# Patient Record
Sex: FEMALE | Race: BLACK OR AFRICAN AMERICAN | NOT HISPANIC OR LATINO | Employment: UNEMPLOYED | URBAN - METROPOLITAN AREA
[De-identification: names, ages, dates, MRNs, and addresses within clinical notes are randomized per-mention and may not be internally consistent; named-entity substitution may affect disease eponyms.]

---

## 2018-05-13 LAB — HEP C VIRUS AB: NEGATIVE

## 2018-05-16 ENCOUNTER — HOSPITAL ENCOUNTER (EMERGENCY)
Facility: HOSPITAL | Age: 38
Discharge: HOME | End: 2018-05-17
Attending: EMERGENCY MEDICINE
Payer: MEDICAID

## 2018-05-16 DIAGNOSIS — T24.101A: ICD-10-CM

## 2018-05-16 DIAGNOSIS — T24.201A SECOND DEGREE BURN OF LEG, RIGHT, INITIAL ENCOUNTER: Primary | ICD-10-CM

## 2018-05-16 PROCEDURE — 25000003 PHARM REV CODE 250: Performed by: EMERGENCY MEDICINE

## 2018-05-16 PROCEDURE — 90471 IMMUNIZATION ADMIN: CPT | Performed by: EMERGENCY MEDICINE

## 2018-05-16 PROCEDURE — 99283 EMERGENCY DEPT VISIT LOW MDM: CPT

## 2018-05-16 PROCEDURE — 90715 TDAP VACCINE 7 YRS/> IM: CPT | Performed by: EMERGENCY MEDICINE

## 2018-05-16 PROCEDURE — 63600175 PHARM REV CODE 636 W HCPCS: Performed by: EMERGENCY MEDICINE

## 2018-05-16 RX ORDER — BACITRACIN 500 [USP'U]/G
OINTMENT TOPICAL
Status: COMPLETED | OUTPATIENT
Start: 2018-05-16 | End: 2018-05-16

## 2018-05-16 RX ORDER — LISINOPRIL 2.5 MG/1
2.5 TABLET ORAL DAILY
COMMUNITY

## 2018-05-16 RX ORDER — LOVASTATIN 20 MG/1
20 TABLET ORAL NIGHTLY
COMMUNITY

## 2018-05-16 RX ORDER — OMEPRAZOLE 40 MG/1
40 CAPSULE, DELAYED RELEASE ORAL DAILY
COMMUNITY

## 2018-05-16 RX ADMIN — BACITRACIN: 500 OINTMENT TOPICAL at 10:05

## 2018-05-16 RX ADMIN — CLOSTRIDIUM TETANI TOXOID ANTIGEN (FORMALDEHYDE INACTIVATED), CORYNEBACTERIUM DIPHTHERIAE TOXOID ANTIGEN (FORMALDEHYDE INACTIVATED), BORDETELLA PERTUSSIS TOXOID ANTIGEN (GLUTARALDEHYDE INACTIVATED), BORDETELLA PERTUSSIS FILAMENTOUS HEMAGGLUTININ ANTIGEN (FORMALDEHYDE INACTIVATED), BORDETELLA PERTUSSIS PERTACTIN ANTIGEN, AND BORDETELLA PERTUSSIS FIMBRIAE 2/3 ANTIGEN 0.5 ML: 5; 2; 2.5; 5; 3; 5 INJECTION, SUSPENSION INTRAMUSCULAR at 10:05

## 2018-05-17 VITALS
RESPIRATION RATE: 18 BRPM | SYSTOLIC BLOOD PRESSURE: 135 MMHG | WEIGHT: 50 LBS | DIASTOLIC BLOOD PRESSURE: 82 MMHG | HEART RATE: 75 BPM | TEMPERATURE: 98 F | OXYGEN SATURATION: 99 %

## 2018-05-17 RX ORDER — IBUPROFEN 800 MG/1
800 TABLET ORAL EVERY 6 HOURS PRN
Qty: 20 TABLET | Refills: 0 | Status: SHIPPED | OUTPATIENT
Start: 2018-05-17

## 2018-05-17 RX ORDER — SULFAMETHOXAZOLE AND TRIMETHOPRIM 800; 160 MG/1; MG/1
1 TABLET ORAL 2 TIMES DAILY
Qty: 14 TABLET | Refills: 0 | Status: SHIPPED | OUTPATIENT
Start: 2018-05-17 | End: 2018-05-24

## 2018-05-17 RX ORDER — BACITRACIN ZINC 500 UNIT/G
OINTMENT (GRAM) TOPICAL DAILY
Qty: 30 G | Refills: 0 | COMMUNITY
Start: 2018-05-17
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
## 2018-05-18 ENCOUNTER — PES CALL (OUTPATIENT)
Dept: ADMINISTRATIVE | Facility: CLINIC | Age: 38
End: 2018-05-18

## 2019-04-02 DIAGNOSIS — M54.2 NECK PAIN: Primary | ICD-10-CM

## 2019-04-25 ENCOUNTER — CLINICAL SUPPORT (OUTPATIENT)
Dept: REHABILITATION | Facility: HOSPITAL | Age: 39
End: 2019-04-25
Attending: NURSE PRACTITIONER
Payer: MEDICAID

## 2019-04-25 DIAGNOSIS — M62.81 MUSCLE WEAKNESS: ICD-10-CM

## 2019-04-25 DIAGNOSIS — M54.2 NECK PAIN: ICD-10-CM

## 2019-04-25 DIAGNOSIS — M62.89 MUSCLE TIGHTNESS: ICD-10-CM

## 2019-04-25 PROCEDURE — 97161 PT EVAL LOW COMPLEX 20 MIN: CPT | Mod: PN

## 2019-04-25 NOTE — PROGRESS NOTES
"                                                    Physical Therapy Initial Evaluation     Name: Gisela Nguyễn  Clinic Number: [id number]    Therapy Diagnosis:   Encounter Diagnoses   Name Primary?    Neck pain     Muscle weakness     Muscle tightness      Physician: Ann Genao, NP-C    Physician Orders: PT Eval and Treat   Medical Diagnosis from Referral: Neck pain  Evaluation Date: 4/25/2019  Authorization Period Expiration: 04/01/2020  Plan of Care Expiration: 6/25/19  Visit # / Visits authorized: 1/ 1    Time In: 1100  Time Out: 1200  Total Billable Time: 60 minutes    Precautions: Standard    Subjective     Medical History:   No past medical history on file.    Surgical History:   Gisela Nguyễn  has no past surgical history on file.    Medications:   Gisela BLANK has a current medication list which includes the following prescription(s): bacitracin, ibuprofen, lisinopril, lovastatin, and omeprazole.    Allergies:   Review of patient's allergies indicates:  No Known Allergies     Date of onset: 3-4 months ago  History of current condition - Gisela reports: Midline neck pain that can have swelling over the spinous process. Has tenderness to palpation.  Pain began 3-4 months ago with X-ray performed with "mild case of arthritis". She is  at [employer] which job duties includes heavy lifting.  Denies prior pain in neck before 3 months ago. Had R dislocated shoulder in February 2019, required going to hospital and had R shoulder relocated. No previous surgeries or injections to neck or shoulder. Denies numbness or tingling. Pt states she was diagnosed with scoliosis in grade school, but never treated. R-handed.   Still working full duty,    Prior Therapy: None  Social History: lives with daughter  Occupation: Asst Manager at [employer]  Prior Level of Function: Indep  DME owned/used: None  Current Level of Function: Indep    Pain:  Current 7/10, worst 9/10, best 3/10   Location: midline lower " cervical spine  Description: Aching  Aggravating Factors: lifting objects overhead, picking objects from floor, turning head to either side  Easing Factors: heat    Pts goals: Reduce pain, be able to lift over-head, decrease tenderness to touch    Objective     Posture Alignment: forward head, slouched posture    CERVICAL SPINE AROM:   Flexion: 50   Extension: 40   Left Sidebend: 45   Right Sidebend: 45   Left Rotation: 80   Right Rotation: 80     UE ROM:  R UE: WNL; p! R UT/LS  LUE: WNL - ipsilateral cervical side-bending    SEGMENTAL MOBILITY: WFL C2-6, C7 empty end-feel due to muscle guarding/pain    UPPER EXTREMITY STRENGTH:   Left Right   Shoulder Flexion 4+/5 4+/5   Shoulder Abduction 4+/5 4+/5     Shoulder Internal Rotation 4+/5 4+/5   Shoulder External Rotation 4/5 4/5   Elbow Flexion 4+/5 4+/5   Elbow Extension 4/5 4/5     Dermatomes: Sensation: Light Touch: Intact  Myotomes: intact  Palpation: acute tenderness to C7 spinous process, R UT/LS    Special Tests:   Left Right   Compression Negative Negative   Distraction Negative Negative   Spurling Negative Negative     Pt/family was provided educational information, including: role of PT, goals for PT, scheduling - pt verbalized understanding. Discussed insurance limitations with pt.     Functional Limitations Reports - G Codes  Category: Mobility  Tool: FOTO Neck Survey  Score: 37% Limitation   Modifier  Impairment Limitation Restriction    CH  0 % impaired, limited or restricted    CI  @ least 1% but less than 20% impaired, limited or restricted    CJ  @ least 20%<40% impaired, limited or restricted    CK  @ least 40%<60% impaired, limited or restricted    CL  @ least 60% <80% impaired, limited or restricted    CM  @ least 80%<100% impaired limited or restricted    CN  100% impaired, limited or restricted     Current/  CJ = 20-40% limitation  Goal/ : CI =  1-20% limitation    TREATMENT     Treatment Time In: 1130  Treatment Time Out: 1145  Total  "Treatment time separate from Evaluation: 15 minutes    Gisela received therapeutic exercises to develop strength, endurance, ROM, flexibility, posture and core stabilization for 15 minutes including:    Supine Chin Tuck 15x  Upper Trap Str 2x30" ea  Lev Scap Str 2x30" ea    Home Exercises and Patient Education Provided    Education provided:   Written Home Exercises Provided: yes.  Exercises were reviewed and Gisela was able to demonstrate them prior to the end of the session.  Gisela demonstrated good  understanding of the education provided.     See EMR under Patient Instructions for exercises provided 4/25/2019.    Assessment     Gisela BLANK is a 39 y.o. female referred to outpatient Physical Therapy with a medical diagnosis of Neck Pain. with signs and symptoms including: increased neck pain, decreased lumbar ROM, decreased LE Strength, soft tissue dysfunction, postural imbalance,impaired joint mobility, and decreased tolerance to functional activities. Pain isolated to midline cervical spine, with acute tenderness to C7 spinous process; mild tenderness and soft tissue restriction at R UT/LS musculature. Fair cervical ROM and UE strength maintained. No paresthesia noted this session with light touch intact during sensory testing. Soft tissue restriction noted at R UT/LS with palpable myofascial trigger point identified. Impaired posture with forward head and rounded thoracic kyphosis contributing to symptom provocation.   Pt with good motivation to perform physical activity and responds well to cueing.    Pt prognosis is Good.   Pt will benefit from skilled outpatient Physical Therapy to address the deficits stated above and in the chart below, provide pt/family education, and to maximize pt's level of independence.     Plan of care discussed with patient: Yes  Pt's spiritual, cultural and educational needs considered and patient is agreeable to the plan of care and goals as stated below:     Anticipated Barriers for " therapy: none    Medical Necessity is demonstrated by the following  History  Co-morbidities and personal factors that may impact the plan of care Co-morbidities:   HTN, GERD    Personal Factors:   no deficits     low   Examination  Body Structures and Functions, activity limitations and participation restrictions that may impact the plan of care Body Regions:   neck  upper extremities  trunk    Body Systems:    gross symmetry  ROM  strength  gross coordinated movement  transitions  motor control  motor learning    Participation Restrictions:   Heavy household activities, lifting activities at work, recreational activity, driving (checking blind spot)    Activity limitations:   Learning and applying knowledge  no deficits    General Tasks and Commands  no deficits    Communication  no deficits    Mobility  lifting and carrying objects    Self care  no deficits    Domestic Life  shopping  cooking  doing house work (cleaning house, washing dishes, laundry)  assisting others    Interactions/Relationships  No deficits    Life Areas  No deficits    Community and Social Life  No deficits         low   Clinical Presentation stable and uncomplicated low   Decision Making/ Complexity Score: low     Pt's spiritual, cultural and educational needs considered and pt agreeable to plan of care and goals as stated below:     Short Term GOALS: 4 weeks. Pt agrees with goals set.  1. Patient demonstrates independence with HEP.   2. Patient demonstrates independence with Postural Awareness.   3. Patient demonstrates independence with body mechanics.   4. Patient will report pain of 5/10 at worst, on 0-10 pain scale, with all activity  5. Patient demonstrates increased cervical ROM by 5 degrees in all planes to improve tolerance to functional activities pain free.   6. Patient demonstrates increased strength BUE's by 1/3 muscle grade or greater to improve tolerance to functional activities pain free.     Long Term GOALS: 8 weeks. Pt  agrees with goals set.  1. Patient demonstrates increased cervical ROM to WNL to improve tolerance to functional activities pain free.   2. Patient demonstrates increased strength BUE's to 4+/5 or greater to improve tolerance to functional activities pain free.   3. Patient demonstrates improved overall function per FOTO Neck Survey to 30% Limitation or less.   4. Patient will report pain of 0/10 at worst, on 0-10 pain scale, with all activity  5. Patient demonstrates ability to lift 10 pound object from floor to waist, proper lifting mechanics, no pain provoked  6. Patient demonstrates ability to lift 10 pound object from waist to over-head shelf, proper lifting mechanics, no pain provoked    PLAN     Plan of care Certification: 4/25/2019 to 6/25/19.    Outpatient Physical Therapy 2 times weekly for 8 weeks to include the following interventions: Cervical/Lumbar Traction, Electrical Stimulation IFC/NMES, Gait Training, Iontophoresis (with Dexamethasone), Manual Therapy, Moist Heat/ Ice, Neuromuscular Re-ed, Patient Education, Self Care, Therapeutic Activites, Therapeutic Exercise, Ultrasound and Dry Needling.  Pt may be seen by PTA as part of the rehabilitation team.     Orlando Soto, PT  4/25/2019    I have seen the patient, reviewed the therapist's plan of care, and I agree with the plan of care.      I certify the need for these services furnished under this plan of treatment and while under my care.     ___________________ ________ Physician/Referring Practitioner            ___________________________ Date of Signature

## 2019-05-02 NOTE — PROGRESS NOTES
"  Physical Therapy Daily Treatment Note     Name: Gisela Nguyễn  Clinic Number: [id number]    Therapy Diagnosis:   Encounter Diagnoses   Name Primary?    Neck pain Yes    Muscle weakness     Muscle tightness      Physician: Ann Genao NPJuanC    Visit Date: 5/3/2019    Physician Orders: PT Eval and Treat  Medical Diagnosis: Neck pain  Evaluation Date: 4/25/19  Authorization Period Expiration: 4/1/20  Plan of Care Certification Period: 6/25/19  Visit #/Visits authorized: 2/ 16     Time In: 1040  Time Out: 1140  Total Billable Time: 60 minutes    Precautions: Standard    Subjective     Pt reports: Feels like things have been about the same  She was compliant with home exercise program.  Response to previous treatment: Reduced pain  Functional change: No change    Pain: 6/10  Location: bilateral neck      Objective     Gisela received therapeutic exercises to develop strength and posture for 48 minutes including:    +UBE x 5 mins     Supine Chin Tuck x 15   Upper Trap Str 3 x 30" ea  Lev Scap Str 3 x 30" ea  +Open book x 20   Scap retraction x 20   +No money RTB 2 x 15   +Press up 2# dowel x 20     +Supine should abd RTB 2 x 15  +Standing shoulder extension RTB 2 x 15         Gisela received the following manual therapy techniques: Myofacial release were applied 12 minutes, including:    Suboccipital release with manual upper trap stretch    Gisela received hot pack for 10 minutes to Neck.    Gisela received cold pack for 0 minutes to .      Home Exercises Provided and Patient Education Provided     Education provided:   - Continue with HEP, posture when sitting    Written Home Exercises Provided: Patient instructed to cont prior HEP.  Exercises were reviewed and Gisela was able to demonstrate them prior to the end of the session.  Gisela demonstrated good  understanding of the education provided.     See EMR under Patient Instructions for exercises provided prior visit.    Assessment     Patient tolerated today's " treatment session well. Staff progressed postural exercises and incorporated STM end of session with positive results. Continues to benefit from skilled therapy sessions for progression of exercise routine.     Gisela is progressing well towards her goals.   Pt prognosis is Excellent.     Pt will continue to benefit from skilled outpatient physical therapy to address the deficits listed in the problem list box on initial evaluation, provide pt/family education and to maximize pt's level of independence in the home and community environment.     Pt's spiritual, cultural and educational needs considered and pt agreeable to plan of care and goals.     Anticipated barriers to physical therapy: Pain    Goals:     Short Term GOALS: 4 weeks. Pt agrees with goals set.  1. Patient demonstrates independence with HEP.   2. Patient demonstrates independence with Postural Awareness.   3. Patient demonstrates independence with body mechanics.   4. Patient will report pain of 5/10 at worst, on 0-10 pain scale, with all activity  5. Patient demonstrates increased cervical ROM by 5 degrees in all planes to improve tolerance to functional activities pain free.   6. Patient demonstrates increased strength BUE's by 1/3 muscle grade or greater to improve tolerance to functional activities pain free.      Long Term GOALS: 8 weeks. Pt agrees with goals set.  1. Patient demonstrates increased cervical ROM to WNL to improve tolerance to functional activities pain free.   2. Patient demonstrates increased strength BUE's to 4+/5 or greater to improve tolerance to functional activities pain free.   3. Patient demonstrates improved overall function per FOTO Neck Survey to 30% Limitation or less.   4. Patient will report pain of 0/10 at worst, on 0-10 pain scale, with all activity  5. Patient demonstrates ability to lift 10 pound object from floor to waist, proper lifting mechanics, no pain provoked  6. Patient demonstrates ability to lift 10  pound object from waist to over-head shelf, proper lifting mechanics, no pain provoked        Plan     Plan of care Certification: 4/25/2019 to 6/25/19.     Outpatient Physical Therapy 2 times weekly for 8 weeks to include the following interventions: Cervical/Lumbar Traction, Electrical Stimulation IFC/NMES, Gait Training, Iontophoresis (with Dexamethasone), Manual Therapy, Moist Heat/ Ice, Neuromuscular Re-ed, Patient Education, Self Care, Therapeutic Activites, Therapeutic Exercise, Ultrasound and Dry Needling.  Pt may be seen by PTA as part of the rehabilitation team.       Hernán Benjamin, PTA   05/02/2019

## 2019-05-03 ENCOUNTER — CLINICAL SUPPORT (OUTPATIENT)
Dept: REHABILITATION | Facility: HOSPITAL | Age: 39
End: 2019-05-03
Attending: NURSE PRACTITIONER
Payer: MEDICAID

## 2019-05-03 DIAGNOSIS — M62.89 MUSCLE TIGHTNESS: ICD-10-CM

## 2019-05-03 DIAGNOSIS — M54.2 NECK PAIN: Primary | ICD-10-CM

## 2019-05-03 DIAGNOSIS — M62.81 MUSCLE WEAKNESS: ICD-10-CM

## 2019-05-03 PROCEDURE — 97140 MANUAL THERAPY 1/> REGIONS: CPT | Mod: PN

## 2019-05-03 PROCEDURE — 97110 THERAPEUTIC EXERCISES: CPT | Mod: PN

## 2019-05-09 ENCOUNTER — CLINICAL SUPPORT (OUTPATIENT)
Dept: REHABILITATION | Facility: HOSPITAL | Age: 39
End: 2019-05-09
Attending: NURSE PRACTITIONER
Payer: MEDICAID

## 2019-05-09 DIAGNOSIS — M62.89 MUSCLE TIGHTNESS: ICD-10-CM

## 2019-05-09 DIAGNOSIS — M62.81 MUSCLE WEAKNESS: ICD-10-CM

## 2019-05-09 DIAGNOSIS — M54.2 NECK PAIN: Primary | ICD-10-CM

## 2019-05-09 PROCEDURE — 97110 THERAPEUTIC EXERCISES: CPT | Mod: PN

## 2019-05-09 PROCEDURE — 97140 MANUAL THERAPY 1/> REGIONS: CPT | Mod: PN

## 2019-05-09 NOTE — PROGRESS NOTES
"  Physical Therapy Daily Treatment Note     Name: Gisela Nguyễn  Clinic Number: [id number]    Therapy Diagnosis:   Encounter Diagnoses   Name Primary?    Neck pain Yes    Muscle weakness     Muscle tightness      Physician: Ann Genao NPJuanC    Visit Date: 5/9/2019    Physician Orders: PT Eval and Treat  Medical Diagnosis: Neck pain  Evaluation Date: 4/25/19  Authorization Period Expiration: 4/1/20  Plan of Care Certification Period: 6/25/19  Visit #/Visits authorized: 3/ 16   PTA Visit 2/6     Time In: 10:45  Time Out: 1145  Total Billable Time: 55 minutes    Precautions: Standard    Subjective     Pt reports: Felt relief after last session but pain returned when she began working. Continues with HEP outside of sessions. Pain is a little bit higher today.     She was compliant with home exercise program.  Response to previous treatment: Reduced pain  Functional change: No change    Pain: 8/10  Location: bilateral neck      Objective     Gisela received therapeutic exercises to develop strength and posture for 45 minutes including:    UBE x 6 mins     Supine Chin Tuck x 15   Upper Trap Str 3 x 30" ea  Lev Scap Str 3 x 30" ea  Open book x 20   Scap retraction x 20   No money RTB 2 x 15   Press up 3# dowel x 20   +Scap protraction 3# dowel x 20     Supine should abd RTB 2 x 15  Standing shoulder extension RTB 2 x 15   +Standing row RTB 2 x 15        Gisela received the following manual therapy techniques: Myofacial release were applied 10 minutes, including:    Suboccipital release with manual upper trap stretch    Gisela received hot pack for 10 minutes to Neck.    Gisela received cold pack for 0 minutes to .      Home Exercises Provided and Patient Education Provided     Education provided:   - Continue with HEP, posture when sitting    Written Home Exercises Provided: Patient instructed to cont prior HEP.  Exercises were reviewed and Gisela was able to demonstrate them prior to the end of the session.  Gisela " demonstrated good  understanding of the education provided.     See EMR under Patient Instructions for exercises provided prior visit.    Assessment     Patient tolerated today's treatment session well. Staff progressed exercises today with appropriate level of muscle fatigue achieved. Relief provided by STM end of session and with combination of heat application. Patient continues to benefit from skilled sessions for progression of strengthening activities.     Gisela is progressing well towards her goals.   Pt prognosis is Excellent.     Pt will continue to benefit from skilled outpatient physical therapy to address the deficits listed in the problem list box on initial evaluation, provide pt/family education and to maximize pt's level of independence in the home and community environment.     Pt's spiritual, cultural and educational needs considered and pt agreeable to plan of care and goals.     Anticipated barriers to physical therapy: Pain    Goals:     Short Term GOALS: 4 weeks. Pt agrees with goals set.  1. Patient demonstrates independence with HEP.   2. Patient demonstrates independence with Postural Awareness.   3. Patient demonstrates independence with body mechanics.   4. Patient will report pain of 5/10 at worst, on 0-10 pain scale, with all activity  5. Patient demonstrates increased cervical ROM by 5 degrees in all planes to improve tolerance to functional activities pain free.   6. Patient demonstrates increased strength BUE's by 1/3 muscle grade or greater to improve tolerance to functional activities pain free.      Long Term GOALS: 8 weeks. Pt agrees with goals set.  1. Patient demonstrates increased cervical ROM to WNL to improve tolerance to functional activities pain free.   2. Patient demonstrates increased strength BUE's to 4+/5 or greater to improve tolerance to functional activities pain free.   3. Patient demonstrates improved overall function per FOTO Neck Survey to 30% Limitation or  less.   4. Patient will report pain of 0/10 at worst, on 0-10 pain scale, with all activity  5. Patient demonstrates ability to lift 10 pound object from floor to waist, proper lifting mechanics, no pain provoked  6. Patient demonstrates ability to lift 10 pound object from waist to over-head shelf, proper lifting mechanics, no pain provoked        Plan     Plan of care Certification: 4/25/2019 to 6/25/19.     Outpatient Physical Therapy 2 times weekly for 8 weeks to include the following interventions: Cervical/Lumbar Traction, Electrical Stimulation IFC/NMES, Gait Training, Iontophoresis (with Dexamethasone), Manual Therapy, Moist Heat/ Ice, Neuromuscular Re-ed, Patient Education, Self Care, Therapeutic Activites, Therapeutic Exercise, Ultrasound and Dry Needling.  Pt may be seen by PTA as part of the rehabilitation team.       Hernán Benjamin, PTA   05/09/2019

## 2019-05-15 ENCOUNTER — CLINICAL SUPPORT (OUTPATIENT)
Dept: REHABILITATION | Facility: HOSPITAL | Age: 39
End: 2019-05-15
Attending: NURSE PRACTITIONER
Payer: MEDICAID

## 2019-05-15 DIAGNOSIS — M62.89 MUSCLE TIGHTNESS: ICD-10-CM

## 2019-05-15 DIAGNOSIS — M54.2 NECK PAIN: ICD-10-CM

## 2019-05-15 DIAGNOSIS — M62.81 MUSCLE WEAKNESS: ICD-10-CM

## 2019-05-15 PROCEDURE — 97110 THERAPEUTIC EXERCISES: CPT | Mod: PN

## 2019-05-15 PROCEDURE — 97140 MANUAL THERAPY 1/> REGIONS: CPT | Mod: PN

## 2019-05-15 NOTE — PROGRESS NOTES
"  Physical Therapy Daily Treatment Note     Name: Gisela Nguyễn  Clinic Number: [id number]    Therapy Diagnosis:   Encounter Diagnoses   Name Primary?    Neck pain     Muscle weakness     Muscle tightness      Physician: Ann Genao NPJuanC    Visit Date: 5/15/2019    Physician Orders: PT Eval and Treat  Medical Diagnosis: Neck pain  Evaluation Date: 4/25/19  Authorization Period Expiration: 4/1/20  Plan of Care Certification Period: 6/25/19  Visit #/Visits authorized: 4/ 16   PTA Visit 3/6     Time In: 10:40   Time Out: 1140  Total Billable Time: 30 minutes    Precautions: Standard    Subjective     Pt reports: Pain less today. Feels that overall things are improving.     She was compliant with home exercise program.  Response to previous treatment: Reduced pain  Functional change: No change    Pain: 5/10  Location: bilateral neck      Objective     Gisela received therapeutic exercises to develop strength and posture for 45 minutes including:    UBE x 6 mins     Supine Chin Tuck x 15   +Supine cervical rotation x 20   Upper Trap Str 3 x 30" ea  Lev Scap Str 3 x 30" ea  Open book x 20   Scap retraction x 20   No money RTB 2 x 15   Press up 3# dowel x 20   Scap protraction 3# dowel x 20     Supine should abd RTB 2 x 15  Standing shoulder extension RTB 2 x 15   Standing row RTB 2 x 15  +Push up plus 2 x 10         Gisela received the following manual therapy techniques: Myofacial release were applied 15 minutes, including:    Suboccipital release with manual upper trap stretch    Gisela received hot pack for 10 minutes to Neck.    Gisela received cold pack for 0 minutes to .      Home Exercises Provided and Patient Education Provided     Education provided:   - Continue with HEP, posture when sitting    Written Home Exercises Provided: Patient instructed to cont prior HEP.  Exercises were reviewed and Gisela was able to demonstrate them prior to the end of the session.  Gisela demonstrated good  understanding of the " education provided.     See EMR under Patient Instructions for exercises provided prior visit.    Assessment     Patient tolerated today's treatment session well. Staff monitored patient response to activity and provided cues for technique as needed. Continues to report pain after working. Educated patient on body mechanics and avoiding lifting objects over head. Continues to benefit from skilled therapies for progression of strength activities. Patient instructed in towel/tennis ball sub occipital release for home. Pain end of session rated 3/10.     Gisela is progressing well towards her goals.   Pt prognosis is Excellent.     Pt will continue to benefit from skilled outpatient physical therapy to address the deficits listed in the problem list box on initial evaluation, provide pt/family education and to maximize pt's level of independence in the home and community environment.     Pt's spiritual, cultural and educational needs considered and pt agreeable to plan of care and goals.     Anticipated barriers to physical therapy: Pain    Goals:     Short Term GOALS: 4 weeks. Pt agrees with goals set.  1. Patient demonstrates independence with HEP.   2. Patient demonstrates independence with Postural Awareness.   3. Patient demonstrates independence with body mechanics.   4. Patient will report pain of 5/10 at worst, on 0-10 pain scale, with all activity  5. Patient demonstrates increased cervical ROM by 5 degrees in all planes to improve tolerance to functional activities pain free.   6. Patient demonstrates increased strength BUE's by 1/3 muscle grade or greater to improve tolerance to functional activities pain free.      Long Term GOALS: 8 weeks. Pt agrees with goals set.  1. Patient demonstrates increased cervical ROM to WNL to improve tolerance to functional activities pain free.   2. Patient demonstrates increased strength BUE's to 4+/5 or greater to improve tolerance to functional activities pain free.   3.  Patient demonstrates improved overall function per FOTO Neck Survey to 30% Limitation or less.   4. Patient will report pain of 0/10 at worst, on 0-10 pain scale, with all activity  5. Patient demonstrates ability to lift 10 pound object from floor to waist, proper lifting mechanics, no pain provoked  6. Patient demonstrates ability to lift 10 pound object from waist to over-head shelf, proper lifting mechanics, no pain provoked        Plan     Plan of care Certification: 4/25/2019 to 6/25/19.     Outpatient Physical Therapy 2 times weekly for 8 weeks to include the following interventions: Cervical/Lumbar Traction, Electrical Stimulation IFC/NMES, Gait Training, Iontophoresis (with Dexamethasone), Manual Therapy, Moist Heat/ Ice, Neuromuscular Re-ed, Patient Education, Self Care, Therapeutic Activites, Therapeutic Exercise, Ultrasound and Dry Needling.  Pt may be seen by PTA as part of the rehabilitation team.       Hernán Benjamin, ANA   05/15/2019

## 2019-05-20 ENCOUNTER — CLINICAL SUPPORT (OUTPATIENT)
Dept: REHABILITATION | Facility: HOSPITAL | Age: 39
End: 2019-05-20
Attending: NURSE PRACTITIONER
Payer: MEDICAID

## 2019-05-20 DIAGNOSIS — M62.81 MUSCLE WEAKNESS: ICD-10-CM

## 2019-05-20 DIAGNOSIS — M62.89 MUSCLE TIGHTNESS: ICD-10-CM

## 2019-05-20 DIAGNOSIS — M54.2 NECK PAIN: ICD-10-CM

## 2019-05-20 PROCEDURE — 97140 MANUAL THERAPY 1/> REGIONS: CPT | Mod: PN

## 2019-05-20 PROCEDURE — 97110 THERAPEUTIC EXERCISES: CPT | Mod: PN

## 2019-05-20 NOTE — PROGRESS NOTES
"  Physical Therapy Daily Treatment Note     Name: Gisela Nguyễn  Clinic Number: [id number]    Therapy Diagnosis:   Encounter Diagnoses   Name Primary?    Neck pain     Muscle weakness     Muscle tightness      Physician: Ann Genao NP-C    Visit Date: 5/20/2019    Physician Orders: PT Eval and Treat  Medical Diagnosis: Neck pain  Evaluation Date: 4/25/19  Authorization Period Expiration: 4/1/20  Plan of Care Certification Period: 6/25/19  Visit #/Visits authorized: 5/ 16  PTA Visit 3/6     Time In: 10:00am  Time Out: 11:00am  Total Billable Time: 60 minutes    Precautions: Standard    Subjective     Pt reports: Pain less today. Pt reports difficulty getting comfortable while sleeping over the weekend with pain rating 8/10. Pt states she uses heating pad on both neck and back but unable to decrease pain.    She was compliant with home exercise program.  Response to previous treatment: Reduced pain  Functional change: No change    Pain: 0/10  Location: bilateral neck      Objective     Gisela received therapeutic exercises to develop strength and posture for 40 minutes including:    UBE x 6 mins     Supine Chin Tuck x 15, 3 second hold  +Supine cervical rotation x 20   Upper Trap Str 3 x 30" ea  Lev Scap Str 3 x 30" ea  Open book x 20   Scap retraction x 20   No money RTB 2 x 15   Press up 3# dowel x 20   Scap protraction 3# dowel x 20     Supine should abd RTB 2 x 15  Standing shoulder extension RTB 1 x 15, GTB 1x 15   Standing row RTB 1 x 15, GTB 1x 15  +Push up plus 2 x 10         Gisela received the following manual therapy techniques: Myofacial release were applied 10 minutes, including:    Suboccipital release with manual upper trap stretch    Gisela received hot pack for 10 minutes to Neck.    Gisela received cold pack for 0 minutes to .      Home Exercises Provided and Patient Education Provided     Education provided:   - Continue with HEP, posture when sitting    Written Home Exercises Provided: Patient " instructed to cont prior HEP.  Exercises were reviewed and Gisela was able to demonstrate them prior to the end of the session.  Gisela demonstrated good  understanding of the education provided.     See EMR under Patient Instructions for exercises provided prior visit.    Assessment     Patient tolerated today's treatment session well. Staff monitored patient response to activity and provided cues for technique as needed. Pt reports low back pain following treatment rated 3/10, but no neck pain following treatment today. Patient has anterior pelvic tilt when standing for exercise. PT instructed patient to engage core when standing to help with increased back pain for prolonged periods of standing. Educated patient on body mechanics and avoiding lifting objects over head. Continues to benefit from skilled therapies for progression of strength activities. Patient given green theraband following treatment to continue standing theraband exercises of shoulder extension and rows at home.     Gisela is progressing well towards her goals.   Pt prognosis is Excellent.     Pt will continue to benefit from skilled outpatient physical therapy to address the deficits listed in the problem list box on initial evaluation, provide pt/family education and to maximize pt's level of independence in the home and community environment.     Pt's spiritual, cultural and educational needs considered and pt agreeable to plan of care and goals.     Anticipated barriers to physical therapy: Pain    Goals:     Short Term GOALS: 4 weeks. Pt agrees with goals set.  1. Patient demonstrates independence with HEP.   2. Patient demonstrates independence with Postural Awareness.   3. Patient demonstrates independence with body mechanics.   4. Patient will report pain of 5/10 at worst, on 0-10 pain scale, with all activity  5. Patient demonstrates increased cervical ROM by 5 degrees in all planes to improve tolerance to functional activities pain free.    6. Patient demonstrates increased strength BUE's by 1/3 muscle grade or greater to improve tolerance to functional activities pain free.      Long Term GOALS: 8 weeks. Pt agrees with goals set.  1. Patient demonstrates increased cervical ROM to WNL to improve tolerance to functional activities pain free.   2. Patient demonstrates increased strength BUE's to 4+/5 or greater to improve tolerance to functional activities pain free.   3. Patient demonstrates improved overall function per FOTO Neck Survey to 30% Limitation or less.   4. Patient will report pain of 0/10 at worst, on 0-10 pain scale, with all activity  5. Patient demonstrates ability to lift 10 pound object from floor to waist, proper lifting mechanics, no pain provoked  6. Patient demonstrates ability to lift 10 pound object from waist to over-head shelf, proper lifting mechanics, no pain provoked        Plan     Plan of care Certification: 4/25/2019 to 6/25/19.     Outpatient Physical Therapy 2 times weekly for 8 weeks to include the following interventions: Cervical/Lumbar Traction, Electrical Stimulation IFC/NMES, Gait Training, Iontophoresis (with Dexamethasone), Manual Therapy, Moist Heat/ Ice, Neuromuscular Re-ed, Patient Education, Self Care, Therapeutic Activites, Therapeutic Exercise, Ultrasound and Dry Needling.  Pt may be seen by PTA as part of the rehabilitation team.       Chris Sánchez, PT   Rosa Mccracken PT, DPT  05/20/2019

## 2019-05-31 ENCOUNTER — CLINICAL SUPPORT (OUTPATIENT)
Dept: REHABILITATION | Facility: HOSPITAL | Age: 39
End: 2019-05-31
Attending: NURSE PRACTITIONER
Payer: MEDICAID

## 2019-05-31 DIAGNOSIS — M62.89 MUSCLE TIGHTNESS: ICD-10-CM

## 2019-05-31 DIAGNOSIS — M62.81 MUSCLE WEAKNESS: ICD-10-CM

## 2019-05-31 DIAGNOSIS — M54.2 NECK PAIN: ICD-10-CM

## 2019-05-31 PROCEDURE — 97110 THERAPEUTIC EXERCISES: CPT | Mod: PN

## 2019-05-31 NOTE — PROGRESS NOTES
"  Physical Therapy Daily Treatment Note     Name: Gisela Nguyễn  Clinic Number: [id number]    Therapy Diagnosis:   Encounter Diagnoses   Name Primary?    Neck pain     Muscle weakness     Muscle tightness      Physician: Ann Genao NP-C    Visit Date: 5/31/2019    Physician Orders: PT Eval and Treat  Medical Diagnosis: Neck pain  Evaluation Date: 4/25/19  Authorization Period Expiration: 4/1/20  Plan of Care Certification Period: 6/25/19  Visit #/Visits authorized: 6/ 16  PTA Visit 1/6     Time In: 1500  Time Out: 1600  Total Billable Time: 60 minutes    Precautions: Standard    Subjective     Pt reports: Feels things are about the same. Pain goes away when outside of work and then increases after work. Patient recently came from work so pain is higher.    She was compliant with home exercise program.  Response to previous treatment: Reduced pain  Functional change: No change    Pain: 6/10  Location: bilateral neck      Objective     CMS Impairment/Limitation/Restriction for FOTO Neck Survey  Status Limitation G-Code CMS Severity Modifier  Intake 63% 37%  Predicted 68% 32% Goal Status+ CJ - At least 20 percent but less than 40 percent  5/31/2019 57% 43% Current Status CK - At least 40 percent but less than 60 percent  D/C Status CK **only report if this is discharge survey    UPPER EXTREMITY STRENGTH:    Left Right   Shoulder Flexion 4+/5* 4+/5   Shoulder Abduction 5/5 5/5      Shoulder Internal Rotation 4+/5 4+/5   Shoulder External Rotation 4/5 4/5   Elbow Flexion 5/5 5/5   Elbow Extension 4+/5 4+/5     Flexion: 55*   Extension: 47   Left Sidebend: 40*   Right Sidebend: 40*   Left Rotation: 80   Right Rotation: 80       Gisela received therapeutic exercises to develop strength and posture for 60 minutes including:    UBE x 6 mins     Supine Chin Tuck x 15, 3 second hold  Supine cervical rotation x 20   Upper Trap Str 3 x 30" ea  Lev Scap Str 3 x 30" ea  Open book x 20   Scap retraction x 20   No money RTB 2 " x 15   Press up 3# dowel x 20   Scap protraction 3# dowel x 20     Supine should abd RTB 2 x 15  Standing shoulder extension GTB 2 x 15   Standing row RTB 1 x 15, GTB 2 x 15  Push up plus 2 x 10   +Wall slides w/lift off 2 x 10         Gisela received the following manual therapy techniques: Myofacial release were applied 0 minutes, including:    Suboccipital release with manual upper trap stretch    Gisela received hot pack for 10 minutes to Neck.    Gisela received cold pack for 0 minutes to .      Home Exercises Provided and Patient Education Provided     Education provided:   - Continue with HEP, posture when sitting    Written Home Exercises Provided: Patient instructed to cont prior HEP.  Exercises were reviewed and Gisela was able to demonstrate them prior to the end of the session.  Gisela demonstrated good  understanding of the education provided.     See EMR under Patient Instructions for exercises provided prior visit.    Assessment     FOTO completed during today's session. Patient required minor cues for proper technique with exercises. Staff progressed postural exercises today with appropriate level of muscle fatigue achieved. Physical therapist assessed cervical strength and range of motion, and goals during today's treatment. Pt continues to report pain (8/10 at worst) due to poor posture musculature strength and endurance. Pt pain increase with fatigue following work or activities with prolonged standing. Plan to continue to focus on postural strength and endurance and increase flexibility of cervical musculature.     Gisela is progressing well towards her goals.   Pt prognosis is Excellent.     Pt will continue to benefit from skilled outpatient physical therapy to address the deficits listed in the problem list box on initial evaluation, provide pt/family education and to maximize pt's level of independence in the home and community environment.     Pt's spiritual, cultural and educational needs  considered and pt agreeable to plan of care and goals.     Anticipated barriers to physical therapy: Pain    Goals:     Short Term GOALS: 4 weeks. Pt agrees with goals set.  1. Patient demonstrates independence with HEP. MET 5/31/2019  2. Patient demonstrates independence with Postural Awareness. In progress  3. Patient demonstrates independence with body mechanics. MET 5/31/2019  4. Patient will report pain of 5/10 at worst, on 0-10 pain scale, with all activity. In progress, pt reports 8/10 at worst  5. Patient demonstrates increased cervical ROM by 5 degrees in all planes to improve tolerance to functional activities pain free. In progress  6. Patient demonstrates increased strength BUE's by 1/3 muscle grade or greater to improve tolerance to functional activities pain free. In progress     Long Term GOALS: 8 weeks. Pt agrees with goals set.  1. Patient demonstrates increased cervical ROM to WNL to improve tolerance to functional activities pain free.   2. Patient demonstrates increased strength BUE's to 4+/5 or greater to improve tolerance to functional activities pain free. Met 5/31/2019  3. Patient demonstrates improved overall function per FOTO Neck Survey to 30% Limitation or less.   4. Patient will report pain of 0/10 at worst, on 0-10 pain scale, with all activity  5. Patient demonstrates ability to lift 10 pound object from floor to waist, proper lifting mechanics, no pain provoked  6. Patient demonstrates ability to lift 10 pound object from waist to over-head shelf, proper lifting mechanics, no pain provoked        Plan     Plan of care Certification: 4/25/2019 to 6/25/19.     Outpatient Physical Therapy 2 times weekly for 8 weeks to include the following interventions: Cervical/Lumbar Traction, Electrical Stimulation IFC/NMES, Gait Training, Iontophoresis (with Dexamethasone), Manual Therapy, Moist Heat/ Ice, Neuromuscular Re-ed, Patient Education, Self Care, Therapeutic Activites, Therapeutic  Exercise, Ultrasound and Dry Needling.  Pt may be seen by PTA as part of the rehabilitation team.       Hernán Benjamin, PTA     05/31/2019     Rosa Mccracken, PT, DPT

## 2020-03-12 ENCOUNTER — HOSPITAL ENCOUNTER (EMERGENCY)
Facility: HOSPITAL | Age: 40
Discharge: HOME | End: 2020-03-12
Attending: EMERGENCY MEDICINE
Payer: MEDICAID

## 2020-03-12 VITALS
WEIGHT: 148 LBS | DIASTOLIC BLOOD PRESSURE: 72 MMHG | SYSTOLIC BLOOD PRESSURE: 138 MMHG | HEART RATE: 93 BPM | TEMPERATURE: 99 F | OXYGEN SATURATION: 98 % | RESPIRATION RATE: 16 BRPM

## 2020-03-12 DIAGNOSIS — J06.9 VIRAL URI WITH COUGH: Primary | ICD-10-CM

## 2020-03-12 LAB
CTP QC/QA: YES
POC MOLECULAR INFLUENZA A AGN: NEGATIVE
POC MOLECULAR INFLUENZA B AGN: NEGATIVE

## 2020-03-12 PROCEDURE — 99284 EMERGENCY DEPT VISIT MOD MDM: CPT | Mod: ER

## 2020-03-12 RX ORDER — CETIRIZINE HYDROCHLORIDE 10 MG/1
10 TABLET ORAL DAILY
Qty: 30 TABLET | Refills: 0 | Status: SHIPPED | OUTPATIENT
Start: 2020-03-12 | End: 2020-04-11

## 2020-03-12 RX ORDER — FLUTICASONE PROPIONATE 50 MCG
1 SPRAY, SUSPENSION (ML) NASAL 2 TIMES DAILY PRN
Qty: 15 G | Refills: 0 | Status: SHIPPED | OUTPATIENT
Start: 2020-03-12 | End: 2020-03-19

## 2020-03-12 RX ORDER — GUAIFENESIN/DEXTROMETHORPHAN 100-10MG/5
5 SYRUP ORAL 4 TIMES DAILY PRN
Qty: 120 ML | Refills: 0 | Status: SHIPPED | OUTPATIENT
Start: 2020-03-12 | End: 2020-03-22

## 2020-03-12 NOTE — ED PROVIDER NOTES
"Encounter Date: 3/12/2020       History     Chief Complaint   Patient presents with    URI     Pt states," Cough for three days and a tickle in my throat."     Chief Complaint: URI  History of  Present Illness: History obtained from patient. This 40 y.o. female with PMHx of HTN and hyperlipidemia presents to the ED c/o cough, congestion, rhinorrhea for 2 days. No known sick contacts. Pt attempted treatment with Robitussin this AM without relief. Denies fever, vomiting, diarrhea.            Review of patient's allergies indicates:  No Known Allergies  Past Medical History:   Diagnosis Date    Hypertension      Past Surgical History:   Procedure Laterality Date    HYSTERECTOMY      sinus sx       History reviewed. No pertinent family history.  Social History     Tobacco Use    Smoking status: Never Smoker    Smokeless tobacco: Never Used   Substance Use Topics    Alcohol use: Not on file    Drug use: Not on file     Review of Systems   Constitutional: Negative for chills and fever.   HENT: Positive for congestion and rhinorrhea. Negative for sore throat.    Eyes: Negative for visual disturbance.   Respiratory: Positive for cough. Negative for shortness of breath.    Cardiovascular: Negative for chest pain.   Gastrointestinal: Negative for abdominal pain, diarrhea, nausea and vomiting.   Genitourinary: Negative for dysuria, frequency and hematuria.   Musculoskeletal: Negative for back pain.   Skin: Negative for rash.   Neurological: Negative for dizziness, weakness and headaches.       Physical Exam     Initial Vitals [03/12/20 1746]   BP Pulse Resp Temp SpO2   138/72 93 16 98.5 °F (36.9 °C) 98 %      MAP       --         Physical Exam    Nursing note and vitals reviewed.  Constitutional: She appears well-developed and well-nourished. No distress.   HENT:   Head: Normocephalic and atraumatic.   Right Ear: Tympanic membrane normal.   Left Ear: Tympanic membrane normal.   Nose: Nose normal.   Mouth/Throat: Uvula " is midline, oropharynx is clear and moist and mucous membranes are normal.   Eyes: EOM are normal. Pupils are equal, round, and reactive to light.   Neck: Trachea normal, normal range of motion, full passive range of motion without pain and phonation normal. Neck supple. No stridor present. No spinous process tenderness and no muscular tenderness present. Normal range of motion present. No neck rigidity.   Cardiovascular: Normal rate, regular rhythm and normal heart sounds. Exam reveals no gallop and no friction rub.    No murmur heard.  Pulmonary/Chest: Effort normal and breath sounds normal. No respiratory distress. She has no wheezes. She has no rhonchi. She has no rales.   Abdominal: Soft. Bowel sounds are normal. There is no tenderness. There is no rebound and no guarding.   Musculoskeletal: Normal range of motion.   Neurological: She is alert and oriented to person, place, and time. She has normal strength. No cranial nerve deficit or sensory deficit.   Skin: Skin is warm and dry. Capillary refill takes less than 2 seconds.   Psychiatric: She has a normal mood and affect.         ED Course   Procedures  Labs Reviewed   POCT INFLUENZA A/B MOLECULAR          Imaging Results    None          Medical Decision Making:   ED Management:  This is an evaluation of a 40 y.o. female that presents to the Emergency Department for cough, rhinorrhea and nasal congestion for 3 days. The patient is a non-toxic, afebrile, and well appearing female. On physical exam ears and pharynx are without evidence of infection. Appears well hydrated with moist mucus membranes. Neck soft and supple with no meningeal signs or cervical lymphadenopathy. Breath sounds are clear and equal bilaterally with no adventitious breath sounds, tachypnea or respiratory distress with room air pulse ox of 98% and no evidence of hypoxia.     Vital Signs Are Reassuring.  RESULTS:  Influenza negative.    My overall impression is Viral URI. I considered, but  at this time, do not suspect OM, OE, strep pharyngitis, meningitis, pneumonia, or acute bacterial sinusitis.    Will discharge patient home with symptomatic care.  The diagnosis, treatment plan, instructions for follow-up and reevaluation with PCP as well as ED return precautions were discussed and understanding was verbalized. All questions or concerns have been addressed.                                  Clinical Impression:       ICD-10-CM ICD-9-CM   1. Viral URI with cough J06.9 465.9    B97.89              ED Disposition Condition    Discharge Stable        ED Prescriptions     Medication Sig Dispense Start Date End Date Auth. Provider    dextromethorphan-guaifenesin  mg/5 ml (ROBITUSSIN-DM)  mg/5 mL liquid Take 5 mLs by mouth 4 (four) times daily as needed (cough). 120 mL 3/12/2020 3/22/2020 Tay Soto PA-C    cetirizine (ZYRTEC) 10 MG tablet Take 1 tablet (10 mg total) by mouth once daily. 30 tablet 3/12/2020 4/11/2020 Tay Soto PA-C    fluticasone propionate (FLONASE) 50 mcg/actuation nasal spray 1 spray (50 mcg total) by Each Nostril route 2 (two) times daily as needed for Rhinitis. 15 g 3/12/2020 3/19/2020 Tay Soto PA-C        Follow-up Information     Follow up With Specialties Details Why Contact Info    Ann Genao NP-C [facility]   [address] [address]  [phone]      [facility] Emergency Department Emergency Medicine Go in 1 day If symptoms worsen [address] [address] [phone]                                     Tay Soto PA-C  03/12/20 0398

## 2020-03-12 NOTE — ED NOTES
Medical screening exam completed.  I have conducted a focused provider triage encounter, findings are as follows:    Brief history of present illness:  40 y.o. female with PMHx of HTN and hyperlipidemia presents to the ED c/o cough, congestion, rhinorrhea for 2 days. No known sick contacts. Pt attempted treatment with Robitussin this AM without relief. Denies fever, vomiting, diarrhea.     Vitals:    03/12/20 1746   BP: 138/72   BP Location: Right arm   Patient Position: Sitting   Pulse: 93   Resp: 16   Temp: 98.5 °F (36.9 °C)   TempSrc: Oral   SpO2: 98%   Weight: 67.1 kg (148 lb)       Pertinent physical exam:  NAD. VSS.     Brief workup plan:  Flu swab.     Preliminary workup initiated; this workup will be continued and followed by the physician or advanced practice provider that is assigned to the patient when roomed.      Tay Soto PA-C  03/12/20 [id number]

## 2021-01-19 ENCOUNTER — CLINICAL SUPPORT (OUTPATIENT)
Dept: URGENT CARE | Facility: CLINIC | Age: 41
End: 2021-01-19
Payer: MEDICAID

## 2021-01-19 DIAGNOSIS — Z11.9 SCREENING EXAMINATION FOR INFECTIOUS DISEASE: Primary | ICD-10-CM

## 2021-01-19 LAB
CTP QC/QA: YES
SARS-COV-2 RDRP RESP QL NAA+PROBE: NEGATIVE

## 2021-01-19 PROCEDURE — 87635 SARS-COV-2 COVID-19 AMP PRB: CPT | Mod: QW,S$GLB,, | Performed by: PHYSICIAN ASSISTANT

## 2021-01-19 PROCEDURE — 87635: ICD-10-PCS | Mod: QW,S$GLB,, | Performed by: PHYSICIAN ASSISTANT

## 2021-03-09 LAB — HIV 1+2 AB+HIV1 P24 AG SERPL QL IA: NON REACTIVE

## 2021-05-04 ENCOUNTER — PATIENT OUTREACH (OUTPATIENT)
Dept: ADMINISTRATIVE | Facility: HOSPITAL | Age: 41
End: 2021-05-04

## 2022-10-29 ENCOUNTER — HOSPITAL ENCOUNTER (EMERGENCY)
Facility: HOSPITAL | Age: 42
Discharge: HOME | End: 2022-10-29
Attending: EMERGENCY MEDICINE
Payer: MEDICAID

## 2022-10-29 VITALS
WEIGHT: 145 LBS | BODY MASS INDEX: 26.68 KG/M2 | OXYGEN SATURATION: 97 % | HEIGHT: 62 IN | RESPIRATION RATE: 16 BRPM | DIASTOLIC BLOOD PRESSURE: 86 MMHG | SYSTOLIC BLOOD PRESSURE: 142 MMHG | HEART RATE: 60 BPM | TEMPERATURE: 98 F

## 2022-10-29 DIAGNOSIS — G44.209 TENSION HEADACHE: Primary | ICD-10-CM

## 2022-10-29 PROCEDURE — 96372 THER/PROPH/DIAG INJ SC/IM: CPT | Performed by: EMERGENCY MEDICINE

## 2022-10-29 PROCEDURE — 25000003 PHARM REV CODE 250: Mod: ER | Performed by: EMERGENCY MEDICINE

## 2022-10-29 PROCEDURE — 63600175 PHARM REV CODE 636 W HCPCS: Mod: ER | Performed by: EMERGENCY MEDICINE

## 2022-10-29 PROCEDURE — 99284 EMERGENCY DEPT VISIT MOD MDM: CPT | Mod: ER

## 2022-10-29 RX ORDER — KETOROLAC TROMETHAMINE 30 MG/ML
15 INJECTION, SOLUTION INTRAMUSCULAR; INTRAVENOUS
Status: COMPLETED | OUTPATIENT
Start: 2022-10-29 | End: 2022-10-29

## 2022-10-29 RX ORDER — METOCLOPRAMIDE HYDROCHLORIDE 5 MG/ML
10 INJECTION INTRAMUSCULAR; INTRAVENOUS
Status: COMPLETED | OUTPATIENT
Start: 2022-10-29 | End: 2022-10-29

## 2022-10-29 RX ORDER — NAPROXEN 500 MG/1
500 TABLET ORAL 2 TIMES DAILY WITH MEALS
Qty: 20 TABLET | Refills: 0 | Status: SHIPPED | OUTPATIENT
Start: 2022-10-29

## 2022-10-29 RX ORDER — BUTALBITAL, ACETAMINOPHEN AND CAFFEINE 50; 325; 40 MG/1; MG/1; MG/1
1 TABLET ORAL EVERY 4 HOURS PRN
Qty: 20 TABLET | Refills: 0 | Status: SHIPPED | OUTPATIENT
Start: 2022-10-29 | End: 2022-11-28

## 2022-10-29 RX ORDER — METOCLOPRAMIDE 10 MG/1
10 TABLET ORAL EVERY 6 HOURS PRN
Qty: 30 TABLET | Refills: 0 | Status: SHIPPED | OUTPATIENT
Start: 2022-10-29

## 2022-10-29 RX ORDER — BUTALBITAL, ACETAMINOPHEN AND CAFFEINE 50; 325; 40 MG/1; MG/1; MG/1
1 TABLET ORAL
Status: COMPLETED | OUTPATIENT
Start: 2022-10-29 | End: 2022-10-29

## 2022-10-29 RX ADMIN — KETOROLAC TROMETHAMINE 15 MG: 30 INJECTION, SOLUTION INTRAMUSCULAR at 07:10

## 2022-10-29 RX ADMIN — BUTALBITAL, ACETAMINOPHEN AND CAFFEINE 1 TABLET: 50; 325; 40 TABLET ORAL at 07:10

## 2022-10-29 RX ADMIN — METOCLOPRAMIDE 10 MG: 5 INJECTION, SOLUTION INTRAMUSCULAR; INTRAVENOUS at 07:10

## 2022-10-30 NOTE — ED PROVIDER NOTES
Encounter Date: 10/29/2022    SCRIBE #1 NOTE: I, Gladis Swanson, am scribing for, and in the presence of,  Maulik Weber MD.     History     Chief Complaint   Patient presents with    Headache     Pt has a headaches for the past few days she states it feels like her normal headaches     Gisela Nguyễn is a 42 y.o. female patient with a PMHx of HTN who presents to the Emergency Department for evaluation of aheadache which onset gradually a few days ago. Patient states that she has experienced headaches in the past but current headache is not similar. Symptoms are constant and moderate in severity. No mitigating or exacerbating factors reported. Patient denies any fever, cough, sore throat, nausea, vomit, and all other sxs at this time. No further complaints or concerns at this time.      The history is provided by the patient. No  was used.   Review of patient's allergies indicates:  No Known Allergies  Past Medical History:   Diagnosis Date    Hypertension      Past Surgical History:   Procedure Laterality Date    HYSTERECTOMY      sinus sx       No family history on file.  Social History     Tobacco Use    Smoking status: Never    Smokeless tobacco: Never     Review of Systems   Constitutional: Negative.  Negative for fever.   HENT: Negative.  Negative for sore throat.    Eyes: Negative.    Respiratory: Negative.  Negative for cough and shortness of breath.    Cardiovascular: Negative.  Negative for chest pain.   Gastrointestinal: Negative.  Negative for nausea and vomiting.   Endocrine: Negative.    Genitourinary: Negative.  Negative for dysuria.   Musculoskeletal: Negative.  Negative for myalgias.   Skin: Negative.  Negative for rash.   Allergic/Immunologic: Negative.    Neurological:  Positive for headaches.   Hematological: Negative.  Negative for adenopathy.   Psychiatric/Behavioral: Negative.  Negative for behavioral problems.    All other systems reviewed and are negative.    Physical  Exam     Initial Vitals [10/29/22 1804]   BP Pulse Resp Temp SpO2   (!) 140/84 65 16 98.4 °F (36.9 °C) 99 %      MAP       --         Physical Exam    Nursing note and vitals reviewed.  Constitutional: Vital signs are normal. She appears well-developed and well-nourished. She is active and cooperative.   HENT:   Head: Normocephalic and atraumatic.   Right Ear: External ear normal.   Left Ear: External ear normal.   Nose: Nose normal.   Eyes: Conjunctivae, EOM and lids are normal. Pupils are equal, round, and reactive to light.       Patient's eyeballs are moving.    Neck: Trachea normal. Neck supple. No thyroid mass present. No Brudzinski's sign and no Kernig's sign noted.   No neck stiffness noted.    Normal range of motion.   Full passive range of motion without pain.     Cardiovascular:  Normal rate, regular rhythm, S1 normal, S2 normal, normal heart sounds, intact distal pulses and normal pulses.     Exam reveals no gallop and no friction rub.       No murmur heard.  Pulmonary/Chest: Effort normal and breath sounds normal. No respiratory distress.   Abdominal: Abdomen is soft and flat. Bowel sounds are normal.   Musculoskeletal:         General: Normal range of motion.      Cervical back: Full passive range of motion without pain, normal range of motion and neck supple. No rigidity. Normal range of motion.     Lymphadenopathy:     She has no axillary adenopathy.   Neurological: She is alert and oriented to person, place, and time. She has normal strength and normal reflexes. She is not disoriented. No cranial nerve deficit or sensory deficit.   Skin: Skin is warm, dry and intact. Capillary refill takes less than 2 seconds.   Psychiatric: She has a normal mood and affect. Her speech is normal and behavior is normal. Judgment and thought content normal. Cognition and memory are normal.       ED Course   Procedures  Labs Reviewed - No data to display       Imaging Results    None          Medications    butalbital-acetaminophen-caffeine -40 mg per tablet 1 tablet (1 tablet Oral Given 10/29/22 1935)   ketorolac injection 15 mg (15 mg Intramuscular Given 10/29/22 1936)   metoclopramide HCl injection 10 mg (10 mg Intramuscular Given 10/29/22 1936)     Medical Decision Making:   History:   Old Medical Records: I decided to obtain old medical records.        Scribe Attestation:   Scribe #1: I performed the above scribed service and the documentation accurately describes the services I performed. I attest to the accuracy of the note.                   This document was produced by a scribe under my direction and in my presence. I agree with the content of the note and have made any necessary edits.     Maulik Weber MD    10/30/2022 5:00 AM      Clinical Impression:   Final diagnoses:  [G44.209] Tension headache (Primary)        ED Disposition Condition    Discharge Stable          ED Prescriptions       Medication Sig Dispense Start Date End Date Auth. Provider    metoclopramide HCl (REGLAN) 10 MG tablet Take 1 tablet (10 mg total) by mouth every 6 (six) hours as needed. 30 tablet 10/29/2022 -- Maulik Webre MD    naproxen (NAPROSYN) 500 MG tablet Take 1 tablet (500 mg total) by mouth 2 (two) times daily with meals. 20 tablet 10/29/2022 -- Maulik Weber MD    butalbital-acetaminophen-caffeine -40 mg (FIORICET, ESGIC) -40 mg per tablet Take 1 tablet by mouth every 4 (four) hours as needed for Pain. 20 tablet 10/29/2022 11/28/2022 Maulik Weber MD          Follow-up Information       Follow up With Specialties Details Why Contact Info    Ann Genao [facility] Schedule an appointment as soon as possible for a visit in 1 week  [address] [place] [id number] [phone]               Maulik Weber MD  10/30/22 0883